# Patient Record
(demographics unavailable — no encounter records)

---

## 2024-10-07 NOTE — PHYSICAL EXAM
[IV] : IV [Heart Rate And Rhythm] : heart rate was normal and rhythm regular [Heart Sounds] : normal S1 and S2 [] : no respiratory distress [Respiration, Rhythm And Depth] : normal respiratory rhythm and effort [Auscultation Breath Sounds / Voice Sounds] : lungs were clear to auscultation bilaterally [Abnormal Walk] : normal gait [Oriented To Time, Place, And Person] : oriented to person, place, and time

## 2024-10-08 NOTE — REVIEW OF SYSTEMS
[Hypersomnolence] : sleeping much more than usual [Shortness Of Breath] : no shortness of breath [Chest Pain] : no chest pain [Obesity] : not obese [Depression] : no depression [FreeTextEntry3] : [facility] 30

## 2024-10-08 NOTE — HISTORY OF PRESENT ILLNESS
[FreeTextEntry1] : 45YO Female with hx of COVID-19, hypothyroidism (on levothyroxine), pantoprazole who presents for evaluation of excessive daytime sleepiness.  She was last seen 8/6/2024 with daytime sleepiness with concern for Idiopathic hypersomnia after she had COVID-19 in late June. she reports daytime somnolence despite adequate sleep time; eds is significantly interfering with daytime function and QOL. PSG/MSLT was ordered however it was rejected by insurance given her symptoms had not been present for 3 months. She underwent PSG/MSLT on 9/29/2024 which showed AHI 3.4, RDI 5.2,T90 0% with prolonged REM latency. MSLT was performed showing no SOREMPs and MSOL 7.1mins suggestive of Hypersomnia.  She continues to report significant hypersomnia. She will be starting Hormonal therapy for menopausal sx's. She does take naps and does find them restorative. She is still having vivid dreams. Her naps are between 10mins-1hr. She does report some dreaming with these naps.  [facility]: 30. Sleep schedule 11pm-7am. She feels as if she is sleeping through the night  Diagnostic Tests 9/29/24 PSG: AHI 3.4, RDI 5.2,T90 0% with prolonged REM latency 9/29/24 MSLT: No SOREMPs and MSOL 7.1 mins

## 2024-10-08 NOTE — REVIEW OF SYSTEMS
[Hypersomnolence] : sleeping much more than usual [Shortness Of Breath] : no shortness of breath [Chest Pain] : no chest pain [Obesity] : not obese [Depression] : no depression [FreeTextEntry3] : [facility] 30

## 2024-10-08 NOTE — ASSESSMENT
[FreeTextEntry1] : 47YO Female with hx of COVID-19, hypothyroidism (on levothyroxine), pantoprazole who presents for initial evaluation of excessive daytime sleepiness interfering with daytime function and QOL despite adequate sleep hours.   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 pt will f/u as to her response